# Patient Record
Sex: MALE | Race: BLACK OR AFRICAN AMERICAN | NOT HISPANIC OR LATINO | Employment: FULL TIME | URBAN - METROPOLITAN AREA
[De-identification: names, ages, dates, MRNs, and addresses within clinical notes are randomized per-mention and may not be internally consistent; named-entity substitution may affect disease eponyms.]

---

## 2019-10-23 ENCOUNTER — OFFICE VISIT (OUTPATIENT)
Dept: URGENT CARE | Facility: CLINIC | Age: 24
End: 2019-10-23
Payer: COMMERCIAL

## 2019-10-23 VITALS
SYSTOLIC BLOOD PRESSURE: 124 MMHG | OXYGEN SATURATION: 97 % | HEIGHT: 71 IN | TEMPERATURE: 97.7 F | HEART RATE: 117 BPM | WEIGHT: 198.6 LBS | DIASTOLIC BLOOD PRESSURE: 82 MMHG | BODY MASS INDEX: 27.8 KG/M2 | RESPIRATION RATE: 12 BRPM

## 2019-10-23 DIAGNOSIS — J06.9 UPPER RESPIRATORY TRACT INFECTION, UNSPECIFIED TYPE: ICD-10-CM

## 2019-10-23 LAB
FLUAV+FLUBV AG SPEC QL IA: NEGATIVE
INT CON NEG: NORMAL
INT CON NEG: NORMAL
INT CON POS: NORMAL
INT CON POS: NORMAL
S PYO AG THROAT QL: NEGATIVE

## 2019-10-23 PROCEDURE — 87880 STREP A ASSAY W/OPTIC: CPT | Performed by: PHYSICIAN ASSISTANT

## 2019-10-23 PROCEDURE — 99203 OFFICE O/P NEW LOW 30 MIN: CPT | Performed by: PHYSICIAN ASSISTANT

## 2019-10-23 PROCEDURE — 87804 INFLUENZA ASSAY W/OPTIC: CPT | Performed by: PHYSICIAN ASSISTANT

## 2019-10-23 RX ORDER — NAPROXEN 500 MG/1
TABLET ORAL
Qty: 30 TAB | Refills: 0 | Status: SHIPPED | OUTPATIENT
Start: 2019-10-23 | End: 2023-08-11

## 2019-10-23 SDOH — HEALTH STABILITY: MENTAL HEALTH: HOW OFTEN DO YOU HAVE A DRINK CONTAINING ALCOHOL?: NEVER

## 2019-10-23 ASSESSMENT — ENCOUNTER SYMPTOMS
DIARRHEA: 0
COUGH: 1
SORE THROAT: 1
HEADACHES: 1
RHINORRHEA: 1

## 2019-10-23 NOTE — LETTER
October 23, 2019         Patient: Pradeep Henson III   YOB: 1995   Date of Visit: 10/23/2019           To Whom it May Concern:    Pradeep Henson was seen in my clinic on 10/23/2019. Please excuse from work on 10/23/19.    If you have any questions or concerns, please don't hesitate to call.        Sincerely,           Erika Mendes P.A.-C.  Electronically Signed

## 2019-10-24 NOTE — PROGRESS NOTES
"Subjective:      Pradeep Henson III is a 24 y.o. male who presents with Sore Throat (x 3 days. Pt. complains of sore throat, cough, body aches, vomiting and fever.)            URI    This is a new problem. The current episode started in the past 7 days. There has been no fever. Associated symptoms include congestion, coughing, headaches, rhinorrhea and a sore throat. Pertinent negatives include no diarrhea or dysuria. He has tried acetaminophen for the symptoms. The treatment provided no relief.     Past medical history: Is not pertinent to this examination  Past surgical history: Not pertinent to this examination  Family history: Is not pertinent to this examination  Allergies: No known drug allergies  Social history: Is reviewed in Epic  No current outpatient medications on file prior to visit.     No current facility-administered medications on file prior to visit.        Review of Systems   HENT: Positive for congestion, rhinorrhea and sore throat.    Respiratory: Positive for cough.    Gastrointestinal: Negative for diarrhea.   Genitourinary: Negative for dysuria.   Neurological: Positive for headaches.          Objective:     /82   Pulse (!) 117   Temp 36.5 °C (97.7 °F) (Temporal)   Resp 12   Ht 1.803 m (5' 11\")   Wt 90.1 kg (198 lb 9.6 oz)   SpO2 97%   BMI 27.70 kg/m²      Physical Exam          Gen.: Patient is A and O ×3, no acute distress, well-nourished well-hydrated  Vitals: Are listed and unremarkable  HEENT: Heads normocephalic, atraumatic, PERRLA, extraocular movements intact, TMs and oropharynx clear  Neck: Soft supple without cervical lymphadenopathy  Cardiovascular: Regular rate and rhythm normal S1 and S2. No murmurs, rubs or gallops  Lungs are clear to auscultation bilaterally. no wheezes rales or rhonchi  Abdomen is soft, nontender, nondistended with good bowel sounds, no hepatosplenomegaly  Skin: Is well perfused without evidence of rash or lesions  Neurological:  cranial nerves II " through XII were assessed and intact.  Musculoskeletal: Full range of motion, 5 out of 5 strength against resistance  Neurovascularly: Intact with a 2 second cap refill, good distal pulses    Urgent care course rapid strep was negative and rapid influenza negative      Assessment/Plan:     1. Upper respiratory tract infection, unspecified type  Follow-up as needed.  Discussed viral etiology.  Take Allegra-D  - naproxen (NAPROSYN) 500 MG Tab; Take one pill twice a days as needed with food  Dispense: 30 Tab; Refill: 0  - POCT Influenza A/B  - POCT Rapid Strep A

## 2021-01-24 ENCOUNTER — OFFICE VISIT (OUTPATIENT)
Dept: URGENT CARE | Facility: PHYSICIAN GROUP | Age: 26
End: 2021-01-24
Payer: COMMERCIAL

## 2021-01-24 ENCOUNTER — HOSPITAL ENCOUNTER (OUTPATIENT)
Facility: MEDICAL CENTER | Age: 26
End: 2021-01-24
Attending: FAMILY MEDICINE
Payer: COMMERCIAL

## 2021-01-24 VITALS
DIASTOLIC BLOOD PRESSURE: 94 MMHG | BODY MASS INDEX: 25.91 KG/M2 | HEIGHT: 70 IN | TEMPERATURE: 98.1 F | SYSTOLIC BLOOD PRESSURE: 120 MMHG | RESPIRATION RATE: 12 BRPM | HEART RATE: 107 BPM | WEIGHT: 181 LBS | OXYGEN SATURATION: 100 %

## 2021-01-24 DIAGNOSIS — R73.9 HYPERGLYCEMIA: ICD-10-CM

## 2021-01-24 LAB
APPEARANCE UR: NORMAL
BILIRUB UR STRIP-MCNC: NEGATIVE MG/DL
COLOR UR AUTO: YELLOW
GLUCOSE UR STRIP.AUTO-MCNC: 500 MG/DL
HBA1C MFR BLD: 14.6 % (ref 0–5.6)
INT CON NEG: ABNORMAL
INT CON POS: ABNORMAL
KETONES UR STRIP.AUTO-MCNC: 160 MG/DL
LEUKOCYTE ESTERASE UR QL STRIP.AUTO: NEGATIVE
NITRITE UR QL STRIP.AUTO: NEGATIVE
PH UR STRIP.AUTO: 5 [PH] (ref 5–8)
PROT UR QL STRIP: 100 MG/DL
RBC UR QL AUTO: NORMAL
SP GR UR STRIP.AUTO: 1.03
UROBILINOGEN UR STRIP-MCNC: 0.2 MG/DL

## 2021-01-24 PROCEDURE — 81002 URINALYSIS NONAUTO W/O SCOPE: CPT | Performed by: FAMILY MEDICINE

## 2021-01-24 PROCEDURE — 87086 URINE CULTURE/COLONY COUNT: CPT

## 2021-01-24 PROCEDURE — 99215 OFFICE O/P EST HI 40 MIN: CPT | Performed by: FAMILY MEDICINE

## 2021-01-24 PROCEDURE — 83036 HEMOGLOBIN GLYCOSYLATED A1C: CPT | Performed by: FAMILY MEDICINE

## 2021-01-24 NOTE — PROGRESS NOTES
"Chief Complaint   Patient presents with   • UTI     frequency, dizziness, tired x1wk          HPI:      C/o blurry vision, inc thirst, frequent urination, fatigue x 1 wk.   No hx DM.   Denies cough, f/c/n/v/d.          Past medical history was unremarkable and not pertinent to current issue  Social hx - denies tobacco, alcohol, drug use  Family hx was reviewed - father is diabetic  '        Review of Systems   Constitutional: Negative for fever, chills .   + malaise/fatigue.   Eyes: Negative for vision changes, d/c.    Respiratory: Negative for cough and sputum production.    Cardiovascular: Negative for chest pain and palpitations.   Gastrointestinal: Negative for nausea, vomiting, abdominal pain, diarrhea and constipation.   Genitourinary: Negative for dysuria, urgency and frequency.   Skin: Negative for rash or  itching.   Neurological: Negative for dizziness and tingling.   Psychiatric/Behavioral: Negative for depression.   Hematologic/lymphatic - denies bruising or excessive bleeding  All other systems reviewed and are negative.      OBJECTIVE  /94   Pulse (!) 107   Temp 36.7 °C (98.1 °F)   Resp 12   Ht 1.778 m (5' 10\")   Wt 82.1 kg (181 lb)   SpO2 100%   HEENT - PERRLA, EOMI  Neuro - alert and oriented x3. CN 2-12 grossly intact.  Lungs - CTA. No wheezes, rhonchi or rales.  Heart - regular rate and rhythm without murmur.  Abdomen - soft and non-tender, bowel sounds active x4.  Musculoskeletal - No lower extremity edema noted.  Costa's sign negative bilaterally    Office Visit on 01/24/2021   Component Date Value Ref Range Status   • POC Color 01/24/2021 yellow  Negative Final   • POC Appearance 01/24/2021 slightly cloudy  Negative Final   • POC Leukocyte Esterase 01/24/2021 negative  Negative Final   • POC Nitrites 01/24/2021 negative  Negative Final   • POC Urobiligen 01/24/2021 0.2  Negative (0.2) mg/dL Final   • POC Protein 01/24/2021 100  Negative mg/dL Final   • POC Urine PH 01/24/2021 5.0  " 5.0 - 8.0 Final   • POC Blood 01/24/2021 trace-intact  Negative Final   • POC Specific Gravity 01/24/2021 1.030  <1.005 - >1.030 Final   • POC Ketones 01/24/2021 160  Negative mg/dL Final   • POC Bilirubin 01/24/2021 negative  Negative mg/dL Final   • POC Glucose 01/24/2021 500  Negative mg/dL Final   • Glycohemoglobin 01/24/2021 14.6* 0.0 - 5.6 % Final           A/P:    1. Hyperglycemia, fatigue, polyuria    A1c 14.6  Random glucose - 476  UA shows no sx infection, but + sugar, ketones.   Urine sent for cx.     Sx represent new onset DM, and I have concern for impeding diabetic ketoacidosis    Pt will require a higher level of care      Called report to ERP at [facility] in [place], [place]    Pt will be taken there by significant other.          - URINE CULTURE(NEW); Future         A total of at least 40 minutes were spent with the patient during this encounter taking history, physical, placing orders, chart review and discussing case with ERP.

## 2021-01-26 LAB
BACTERIA UR CULT: NORMAL
SIGNIFICANT IND 70042: NORMAL
SITE SITE: NORMAL
SOURCE SOURCE: NORMAL

## 2022-06-02 ENCOUNTER — HOSPITAL ENCOUNTER (OUTPATIENT)
Facility: MEDICAL CENTER | Age: 27
End: 2022-06-02
Attending: PHYSICIAN ASSISTANT
Payer: COMMERCIAL

## 2022-06-02 ENCOUNTER — OFFICE VISIT (OUTPATIENT)
Dept: URGENT CARE | Facility: PHYSICIAN GROUP | Age: 27
End: 2022-06-02
Payer: COMMERCIAL

## 2022-06-02 VITALS
BODY MASS INDEX: 27.92 KG/M2 | RESPIRATION RATE: 17 BRPM | OXYGEN SATURATION: 99 % | SYSTOLIC BLOOD PRESSURE: 130 MMHG | HEIGHT: 70 IN | WEIGHT: 195 LBS | TEMPERATURE: 97.8 F | DIASTOLIC BLOOD PRESSURE: 88 MMHG | HEART RATE: 101 BPM

## 2022-06-02 DIAGNOSIS — R52 GENERALIZED BODY ACHES: ICD-10-CM

## 2022-06-02 DIAGNOSIS — R51.9 ACUTE NONINTRACTABLE HEADACHE, UNSPECIFIED HEADACHE TYPE: ICD-10-CM

## 2022-06-02 LAB — COVID ORDER STATUS COVID19: NORMAL

## 2022-06-02 PROCEDURE — U0003 INFECTIOUS AGENT DETECTION BY NUCLEIC ACID (DNA OR RNA); SEVERE ACUTE RESPIRATORY SYNDROME CORONAVIRUS 2 (SARS-COV-2) (CORONAVIRUS DISEASE [COVID-19]), AMPLIFIED PROBE TECHNIQUE, MAKING USE OF HIGH THROUGHPUT TECHNOLOGIES AS DESCRIBED BY CMS-2020-01-R: HCPCS

## 2022-06-02 PROCEDURE — U0005 INFEC AGEN DETEC AMPLI PROBE: HCPCS

## 2022-06-02 PROCEDURE — 99213 OFFICE O/P EST LOW 20 MIN: CPT | Performed by: PHYSICIAN ASSISTANT

## 2022-06-02 ASSESSMENT — ENCOUNTER SYMPTOMS
SHORTNESS OF BREATH: 0
DIZZINESS: 0
SPUTUM PRODUCTION: 0
DIARRHEA: 0
SINUS PAIN: 0
SORE THROAT: 0
HEADACHES: 1
PALPITATIONS: 0
NAUSEA: 0
DIAPHORESIS: 0
WHEEZING: 0
MYALGIAS: 1
ABDOMINAL PAIN: 0
COUGH: 0
VOMITING: 0
CHILLS: 0
FEVER: 0

## 2022-06-02 NOTE — PROGRESS NOTES
Subjective:     CHIEF COMPLAINT  Chief Complaint   Patient presents with   • Headache     1 day   • Body Aches     1 day       HPI  Pradeep Henson III is a very pleasant 26 y.o. male who presents to the clinic for coronavirus screening.  Patient states he woke up this morning and had a headache and generalized body aches.  He has not been running a fever.  Describes a mild runny nose and cough.  Denies any sore throat or otalgia.  No shortness of breath or chest pain.  No known ill contacts.  He has not started taking any OTC medications at this time.    REVIEW OF SYSTEMS  Review of Systems   Constitutional: Negative for chills, diaphoresis, fever and malaise/fatigue.   HENT: Positive for congestion. Negative for ear pain, sinus pain and sore throat.    Respiratory: Negative for cough, sputum production, shortness of breath and wheezing.    Cardiovascular: Negative for chest pain and palpitations.   Gastrointestinal: Negative for abdominal pain, diarrhea, nausea and vomiting.   Musculoskeletal: Positive for myalgias.   Neurological: Positive for headaches. Negative for dizziness.   Endo/Heme/Allergies: Negative for environmental allergies.       PAST MEDICAL HISTORY  There are no problems to display for this patient.      SURGICAL HISTORY  patient denies any surgical history    ALLERGIES  No Known Allergies    CURRENT MEDICATIONS  Home Medications     Reviewed by Dhiraj Santana P.A.-C. (Physician Assistant) on 06/02/22 at 0938  Med List Status: <None>   Medication Last Dose Status   Empagliflozin-metFORMIN HCl (SYNJARDY PO) Taking Active   naproxen (NAPROSYN) 500 MG Tab  Active                SOCIAL HISTORY  Social History     Tobacco Use   • Smoking status: Never Smoker   • Smokeless tobacco: Never Used   Vaping Use   • Vaping Use: Never used   Substance and Sexual Activity   • Alcohol use: Not Currently   • Drug use: Yes     Types: Marijuana   • Sexual activity: Not on file       FAMILY HISTORY  History reviewed.  "No pertinent family history.       Objective:     VITAL SIGNS: /88 (BP Location: Left arm, Patient Position: Sitting, BP Cuff Size: Adult)   Pulse (!) 101   Temp 36.6 °C (97.8 °F) (Temporal)   Resp 17   Ht 1.778 m (5' 10\")   Wt 88.5 kg (195 lb)   SpO2 99%   BMI 27.98 kg/m²     PHYSICAL EXAM  Physical Exam  Constitutional:       General: He is not in acute distress.     Appearance: Normal appearance. He is not ill-appearing, toxic-appearing or diaphoretic.   HENT:      Head: Normocephalic and atraumatic.      Right Ear: Tympanic membrane, ear canal and external ear normal.      Left Ear: Tympanic membrane, ear canal and external ear normal.      Nose: Nose normal. No congestion or rhinorrhea.      Mouth/Throat:      Mouth: Mucous membranes are moist.      Pharynx: No oropharyngeal exudate or posterior oropharyngeal erythema.   Eyes:      Conjunctiva/sclera: Conjunctivae normal.   Cardiovascular:      Rate and Rhythm: Normal rate and regular rhythm.      Pulses: Normal pulses.      Heart sounds: Normal heart sounds.   Pulmonary:      Effort: Pulmonary effort is normal.      Breath sounds: Normal breath sounds. No wheezing.   Musculoskeletal:      Cervical back: Normal range of motion. No muscular tenderness.   Lymphadenopathy:      Cervical: No cervical adenopathy.   Skin:     General: Skin is warm and dry.      Capillary Refill: Capillary refill takes less than 2 seconds.   Neurological:      Mental Status: He is alert.   Psychiatric:         Mood and Affect: Mood normal.         Thought Content: Thought content normal.         Assessment/Plan:     1. Generalized body aches  - COVID/SARS CoV-2 PCR; Future    2. Acute nonintractable headache, unspecified headache type  - COVID/SARS CoV-2 PCR; Future      MDM/Comments:      Patient's vital signs are reassuring and they appear hemodynamically stable and do not require higher level care at this time  I discussed self isolation and provided printed " instructions. Stay isolated until results are made available. May take 2-3 days.  Recommended supportive treatment including increased fluids and rest.  Use Tylenol and ibuprofen as needed for pain and fever.   I educated the patient on possibility of a false-negative test and indications for repeat testing  I instructed the patient to try to follow up with their PCP (if applicable) for follow up care  I provided the patient the printed AVS which contains information about signing up for [employer]   I will contact the patient via [employer] with Covid results.  Red flag symptoms were discussed with the patient at length today.  If any of these symptoms present return to the clinic or nearest emergency department.    Please call with any questions or concerns.      Differential diagnosis, natural history, supportive care, and indications for immediate follow-up discussed. All questions answered. Patient agrees with the plan of care.    Follow-up as needed if symptoms worsen or fail to improve to PCP, Urgent care or Emergency Room.    I have personally reviewed prior external notes and test results pertinent to today's visit.  I have independently reviewed and interpreted all diagnostics ordered during this urgent care acute visit.   Discussed management options (risks,benefits, and alternatives to treatment). Pt expresses understanding and the treatment plan was agreed upon. Questions were encouraged and answered to pt's satisfaction.    Please note that this dictation was created using voice recognition software. I have made a reasonable attempt to correct obvious errors, but I expect that there are errors of grammar and possibly content that I did not discover before finalizing the note.

## 2022-06-02 NOTE — LETTER
June 2, 2022    To Whom It May Concern:         This is confirmation that Pradeep Henson III attended his scheduled appointment with Dhiraj Santana P.A.-C. on 6/02/22.         If you have any questions please do not hesitate to call me at the phone number listed below.    Sincerely,          Maribel Reyes-Garcia, Med Ass't  [phone]

## 2022-06-03 LAB
SARS-COV-2 RNA RESP QL NAA+PROBE: DETECTED
SPECIMEN SOURCE: ABNORMAL

## 2023-08-11 ENCOUNTER — OFFICE VISIT (OUTPATIENT)
Dept: URGENT CARE | Facility: PHYSICIAN GROUP | Age: 28
End: 2023-08-11
Payer: COMMERCIAL

## 2023-08-11 VITALS
SYSTOLIC BLOOD PRESSURE: 126 MMHG | RESPIRATION RATE: 20 BRPM | TEMPERATURE: 98.1 F | HEART RATE: 97 BPM | BODY MASS INDEX: 24.05 KG/M2 | DIASTOLIC BLOOD PRESSURE: 82 MMHG | WEIGHT: 168 LBS | OXYGEN SATURATION: 93 % | HEIGHT: 70 IN

## 2023-08-11 DIAGNOSIS — B37.42 CANDIDAL BALANITIS: ICD-10-CM

## 2023-08-11 LAB
APPEARANCE UR: CLEAR
BILIRUB UR STRIP-MCNC: NORMAL MG/DL
COLOR UR AUTO: YELLOW
GLUCOSE UR STRIP.AUTO-MCNC: 500 MG/DL
KETONES UR STRIP.AUTO-MCNC: NORMAL MG/DL
LEUKOCYTE ESTERASE UR QL STRIP.AUTO: NORMAL
NITRITE UR QL STRIP.AUTO: NORMAL
PH UR STRIP.AUTO: 5 [PH] (ref 5–8)
PROT UR QL STRIP: NORMAL MG/DL
RBC UR QL AUTO: NORMAL
SP GR UR STRIP.AUTO: 1
UROBILINOGEN UR STRIP-MCNC: 0.2 MG/DL

## 2023-08-11 PROCEDURE — 99213 OFFICE O/P EST LOW 20 MIN: CPT

## 2023-08-11 PROCEDURE — 3079F DIAST BP 80-89 MM HG: CPT

## 2023-08-11 PROCEDURE — 3074F SYST BP LT 130 MM HG: CPT

## 2023-08-11 PROCEDURE — 81002 URINALYSIS NONAUTO W/O SCOPE: CPT

## 2023-08-11 RX ORDER — FLUCONAZOLE 100 MG/1
100 TABLET ORAL DAILY
Qty: 1 TABLET | Refills: 0 | Status: SHIPPED | OUTPATIENT
Start: 2023-08-11 | End: 2023-08-12

## 2023-08-11 RX ORDER — CLOTRIMAZOLE 1 %
CREAM (GRAM) TOPICAL
Qty: 24 G | Refills: 0 | Status: SHIPPED | OUTPATIENT
Start: 2023-08-11 | End: 2023-12-14

## 2023-08-11 ASSESSMENT — ENCOUNTER SYMPTOMS
ABDOMINAL PAIN: 0
NAUSEA: 0
VOMITING: 0
SHORTNESS OF BREATH: 0
CHILLS: 0
FEVER: 0
MYALGIAS: 0

## 2023-08-11 NOTE — PROGRESS NOTES
Subjective:     CHIEF COMPLAINT  Chief Complaint   Patient presents with    Other     Thinks he has a yeast infection x 3 days        HPI  Pradeep Henson III is a very pleasant 28 y.o. male who presents with a rash on the head of his penis and foreskin for the past 3 days.  He believes that he has a yeast infection based on photos he saw online, although he has never had 1 before.  He has a history of diabetes as well.  He does not have any concern for STDs at this time.  He has not had any burning with urination, fevers, body aches, or chills.  He reports that the skin on the head of his penis has been tender and stinging and that he has noticed white discharge when he pulls down his foreskin.  He otherwise feels well.    REVIEW OF SYSTEMS  Review of Systems   Constitutional:  Negative for chills, fever and malaise/fatigue.   Respiratory:  Negative for shortness of breath.    Cardiovascular:  Negative for chest pain.   Gastrointestinal:  Negative for abdominal pain, nausea and vomiting.   Genitourinary:  Negative for dysuria, frequency and urgency.   Musculoskeletal:  Negative for myalgias.   Skin:  Positive for rash.       PAST MEDICAL HISTORY  There are no problems to display for this patient.      SURGICAL HISTORY  patient denies any surgical history    ALLERGIES  No Known Allergies    CURRENT MEDICATIONS  Home Medications       Reviewed by Marie Raines P.A.-C. (Physician Assistant) on 08/11/23 at 1306  Med List Status: <None>     Medication Last Dose Status   Empagliflozin-metFORMIN HCl (SYNJARDY PO) Taking Active   naproxen (NAPROSYN) 500 MG Tab Not Taking Active                    SOCIAL HISTORY  Social History     Tobacco Use    Smoking status: Never    Smokeless tobacco: Never   Vaping Use    Vaping Use: Never used   Substance and Sexual Activity    Alcohol use: Not Currently    Drug use: Yes     Types: Marijuana    Sexual activity: Not on file       FAMILY HISTORY  History reviewed. No pertinent  "family history.       Objective:     VITAL SIGNS: /82   Pulse 97   Temp 36.7 °C (98.1 °F) (Temporal)   Resp 20   Ht 1.778 m (5' 10\")   Wt 76.2 kg (168 lb)   SpO2 93%   BMI 24.11 kg/m²     PHYSICAL EXAM  Physical Exam  Vitals reviewed. Exam conducted with a chaperone present.   Constitutional:       General: He is not in acute distress.     Appearance: Normal appearance. He is not ill-appearing or toxic-appearing.   HENT:      Head: Normocephalic and atraumatic.      Mouth/Throat:      Mouth: Mucous membranes are moist.   Eyes:      Conjunctiva/sclera: Conjunctivae normal.      Pupils: Pupils are equal, round, and reactive to light.   Pulmonary:      Effort: Pulmonary effort is normal. No respiratory distress.   Genitourinary:     Penis: Uncircumcised. Erythema and discharge present.       Comments: Head of penis is erythematous with white discharge present on the skin of the foreskin.  No vesicles present.  Small skin fissures present along foreskin.  No exudate from urethra.  Skin:     General: Skin is warm and dry.   Neurological:      General: No focal deficit present.      Mental Status: He is alert and oriented to person, place, and time.   Psychiatric:         Mood and Affect: Mood normal.         Assessment/Plan:     1. Candidal balanitis  - POCT Urinalysis  - clotrimazole (LOTRIMIN) 1 % Cream; Apply to affected area twice per day until resolution for 1-3 weeks.  Dispense: 24 g; Refill: 0  - fluconazole (DIFLUCAN) 100 MG Tab; Take 1 Tablet by mouth every day for 1 day.  Dispense: 1 Tablet; Refill: 0  -Keep area clean and dry to the best of your ability  -Return to clinic if symptoms worsen or fail to resolve    MDM/Comments:  Patient has stable vital signs and is non-toxic appearing.  Patient prescribed a one-time dose of fluconazole with clotrimazole to be applied twice daily until rash resolves.  Discussed the correlation of diabetes with candidal skin infections. Patient demonstrated " understanding of treatment plan at this time and will RTC if symptoms worsen or fail to resolve.     Differential diagnosis, natural history, supportive care, and indications for immediate follow-up discussed. All questions answered. Patient agrees with the plan of care.    Follow-up as needed if symptoms worsen or fail to improve to PCP, Urgent care or Emergency Room.    I have personally reviewed prior external notes and test results pertinent to today's visit.  I have independently reviewed and interpreted all diagnostics ordered during this urgent care acute visit.   Discussed management options (risks,benefits, and alternatives to treatment). Pt expresses understanding and the treatment plan was agreed upon. Questions were encouraged and answered to pt's satisfaction.    Please note that this dictation was created using voice recognition software. I have made a reasonable attempt to correct obvious errors, but I expect that there are errors of grammar and possibly content that I did not discover before finalizing the note.

## 2023-08-11 NOTE — LETTER
August 11, 2023    To Whom It May Concern:         This is confirmation that Pradeep Henson SHAMA attended his scheduled appointment with Marie Raines P.A.-C. on 8/11/23. Please excuse his absence from work today.          If you have any questions please do not hesitate to call me at the phone number listed below.    Sincerely,          Marie Raines P.A.-C.  [phone]

## 2023-12-14 ENCOUNTER — OFFICE VISIT (OUTPATIENT)
Dept: URGENT CARE | Facility: PHYSICIAN GROUP | Age: 28
End: 2023-12-14
Payer: COMMERCIAL

## 2023-12-14 VITALS
OXYGEN SATURATION: 94 % | RESPIRATION RATE: 16 BRPM | SYSTOLIC BLOOD PRESSURE: 132 MMHG | HEIGHT: 70 IN | HEART RATE: 108 BPM | WEIGHT: 163 LBS | BODY MASS INDEX: 23.34 KG/M2 | DIASTOLIC BLOOD PRESSURE: 84 MMHG | TEMPERATURE: 100 F

## 2023-12-14 DIAGNOSIS — R73.9 HYPERGLYCEMIA: ICD-10-CM

## 2023-12-14 DIAGNOSIS — R94.31 ABNORMAL EKG: ICD-10-CM

## 2023-12-14 DIAGNOSIS — R07.9 CHEST PAIN, UNSPECIFIED TYPE: ICD-10-CM

## 2023-12-14 LAB — GLUCOSE BLD-MCNC: NORMAL MG/DL (ref 65–99)

## 2023-12-14 PROCEDURE — 3075F SYST BP GE 130 - 139MM HG: CPT | Performed by: NURSE PRACTITIONER

## 2023-12-14 PROCEDURE — 99215 OFFICE O/P EST HI 40 MIN: CPT | Performed by: NURSE PRACTITIONER

## 2023-12-14 PROCEDURE — 93000 ELECTROCARDIOGRAM COMPLETE: CPT | Performed by: NURSE PRACTITIONER

## 2023-12-14 PROCEDURE — 3079F DIAST BP 80-89 MM HG: CPT | Performed by: NURSE PRACTITIONER

## 2023-12-14 PROCEDURE — 82962 GLUCOSE BLOOD TEST: CPT | Performed by: NURSE PRACTITIONER

## 2023-12-14 RX ORDER — ASPIRIN 81 MG/1
324 TABLET, CHEWABLE ORAL ONCE
Status: COMPLETED | OUTPATIENT
Start: 2023-12-14 | End: 2023-12-14

## 2023-12-14 RX ADMIN — ASPIRIN 324 MG: 81 TABLET, CHEWABLE ORAL at 14:26

## 2023-12-14 ASSESSMENT — ENCOUNTER SYMPTOMS
HEADACHES: 0
WEIGHT LOSS: 1
COUGH: 0
SHORTNESS OF BREATH: 0
DIZZINESS: 0
IRREGULAR HEARTBEAT: 0
NERVOUS/ANXIOUS: 0
SORE THROAT: 0
PALPITATIONS: 0
DOUBLE VISION: 0
FEVER: 0
BLURRED VISION: 0
VOMITING: 0

## 2023-12-14 NOTE — PROGRESS NOTES
Subjective:     Pradeep Henson III is a 28 y.o. male who presents for Chest Pain      Presents with left chest pain. Started at 11:30, while walking at work. Reports he was not doing anything out of the ordinary. Chest pain is described as sharp. Increased with ROM of left arm. No GERD. Is a non- smoker.  Hx of DM. Has been off synjardy x 2 months. Reports a weight loss, going from 210-163 over the last year. Had changed diet. Had also experienced an episode of CP earlier this week, with spontaneous resolution. Denies injury. Initially denied URI symptoms, later noted he has had a runny nose x 2 days. No cough or sore throat.    Chest Pain   This is a new problem. The quality of the pain is described as sharp. Pertinent negatives include no cough, dizziness, fever, headaches, irregular heartbeat, palpitations, shortness of breath or vomiting.   His past medical history is significant for diabetes.   His family medical history is significant for diabetes.       History reviewed. No pertinent past medical history.    History reviewed. No pertinent surgical history.    Social History     Socioeconomic History    Marital status: Single     Spouse name: Not on file    Number of children: Not on file    Years of education: Not on file    Highest education level: Not on file   Occupational History    Not on file   Tobacco Use    Smoking status: Never    Smokeless tobacco: Never   Vaping Use    Vaping Use: Never used   Substance and Sexual Activity    Alcohol use: Not Currently    Drug use: Yes     Types: Marijuana    Sexual activity: Not on file   Other Topics Concern    Not on file   Social History Narrative    Not on file     Social Determinants of Health     Financial Resource Strain: Not on file   Food Insecurity: Not on file   Transportation Needs: Not on file   Physical Activity: Not on file   Stress: Not on file   Social Connections: Not on file   Intimate Partner Violence: Not on file   Housing Stability: Not on  "file        History reviewed. No pertinent family history.     No Known Allergies    Review of Systems   Constitutional:  Positive for weight loss. Negative for fever.   HENT:  Negative for sore throat.    Eyes:  Negative for blurred vision and double vision.   Respiratory:  Negative for cough and shortness of breath.    Cardiovascular:  Positive for chest pain. Negative for palpitations and leg swelling.   Gastrointestinal:  Negative for vomiting.   Neurological:  Negative for dizziness and headaches.   Psychiatric/Behavioral:  The patient is not nervous/anxious.    All other systems reviewed and are negative.       Objective:   /84   Pulse (!) 108   Temp 37.8 °C (100 °F) (Temporal)   Resp 16   Ht 1.778 m (5' 10\")   Wt 73.9 kg (163 lb)   SpO2 94%   BMI 23.39 kg/m²     Physical Exam  Vitals reviewed.   Constitutional:       General: He is not in acute distress.     Appearance: He is well-developed. He is not toxic-appearing.   HENT:      Head: Normocephalic and atraumatic.      Right Ear: External ear normal.      Left Ear: External ear normal.      Nose: Mucosal edema present.      Mouth/Throat:      Mouth: Mucous membranes are moist.      Pharynx: Oropharynx is clear.   Eyes:      Conjunctiva/sclera: Conjunctivae normal.   Cardiovascular:      Rate and Rhythm: Normal rate and regular rhythm.   Pulmonary:      Effort: Pulmonary effort is normal. No accessory muscle usage, prolonged expiration, respiratory distress or retractions.      Breath sounds: Normal breath sounds.   Chest:      Chest wall: Tenderness present. No swelling or crepitus.      Comments: Reports left chest and sternal discomfort to palpation.   Musculoskeletal:         General: Normal range of motion.      Cervical back: Neck supple.   Skin:     General: Skin is warm and dry.      Findings: No rash.   Neurological:      Mental Status: He is alert and oriented to person, place, and time.      GCS: GCS eye subscore is 4. GCS verbal " subscore is 5. GCS motor subscore is 6.   Psychiatric:         Speech: Speech normal.         Behavior: Behavior normal.         Thought Content: Thought content normal.         Judgment: Judgment normal.         Assessment/Plan:   1. Chest pain, unspecified type  - POCT Glucose  - EKG - Clinic Performed  - aspirin (Asa) chewable tab 324 mg    2. Hyperglycemia  - POCT Glucose    3. Abnormal EKG  - EKG - Clinic Performed  BG > 600.     No chest and back pain that is described as a ripping or tearing quality. Denies SOB. Has risk factors associated with increase risk for ACS include diabetes mellitus. Discussed concerns for chest pain. Early re-pole on EKG, no reciprocal changes. Hemodynamically stable. Patient referred to follow up emergently for chest pain. Discussed risks and benefits. Patient agrees with plan. Pt to go to [facility] ED, called and spoke with CN.     Differential diagnosis, natural history, supportive care, and indications for immediate follow-up discussed.